# Patient Record
Sex: MALE | Race: WHITE | Employment: FULL TIME | URBAN - NONMETROPOLITAN AREA
[De-identification: names, ages, dates, MRNs, and addresses within clinical notes are randomized per-mention and may not be internally consistent; named-entity substitution may affect disease eponyms.]

---

## 2021-09-04 ENCOUNTER — HOSPITAL ENCOUNTER (EMERGENCY)
Age: 26
Discharge: HOME | End: 2021-09-05
Attending: INTERNAL MEDICINE
Payer: COMMERCIAL

## 2021-09-04 ENCOUNTER — APPOINTMENT (OUTPATIENT)
Dept: GENERAL RADIOLOGY | Age: 26
End: 2021-09-04
Payer: COMMERCIAL

## 2021-09-04 DIAGNOSIS — S41.111A LACERATIONS OF MULTIPLE SITES OF RIGHT ARM, INITIAL ENCOUNTER: Primary | ICD-10-CM

## 2021-09-04 DIAGNOSIS — F10.920 ACUTE ALCOHOLIC INTOXICATION WITHOUT COMPLICATION (HCC): ICD-10-CM

## 2021-09-04 DIAGNOSIS — Z23 NEED FOR TETANUS BOOSTER: ICD-10-CM

## 2021-09-04 DIAGNOSIS — S80.811A ABRASION OF RIGHT LOWER EXTREMITY, INITIAL ENCOUNTER: ICD-10-CM

## 2021-09-04 PROCEDURE — 90715 TDAP VACCINE 7 YRS/> IM: CPT | Performed by: INTERNAL MEDICINE

## 2021-09-04 PROCEDURE — 90471 IMMUNIZATION ADMIN: CPT | Performed by: INTERNAL MEDICINE

## 2021-09-04 PROCEDURE — 2500000003 HC RX 250 WO HCPCS: Performed by: INTERNAL MEDICINE

## 2021-09-04 PROCEDURE — 6360000002 HC RX W HCPCS: Performed by: INTERNAL MEDICINE

## 2021-09-04 PROCEDURE — 73090 X-RAY EXAM OF FOREARM: CPT

## 2021-09-04 PROCEDURE — 12005 RPR S/N/A/GEN/TRK12.6-20.0CM: CPT

## 2021-09-04 PROCEDURE — 99283 EMERGENCY DEPT VISIT LOW MDM: CPT

## 2021-09-04 RX ORDER — LIDOCAINE HYDROCHLORIDE 10 MG/ML
20 INJECTION, SOLUTION INFILTRATION; PERINEURAL ONCE
Status: COMPLETED | OUTPATIENT
Start: 2021-09-05 | End: 2021-09-04

## 2021-09-04 RX ADMIN — LIDOCAINE HYDROCHLORIDE 20 ML: 10 INJECTION, SOLUTION INFILTRATION; PERINEURAL at 23:52

## 2021-09-04 RX ADMIN — TETANUS TOXOID, REDUCED DIPHTHERIA TOXOID AND ACELLULAR PERTUSSIS VACCINE, ADSORBED 0.5 ML: 5; 2.5; 8; 8; 2.5 SUSPENSION INTRAMUSCULAR at 23:51

## 2021-09-04 ASSESSMENT — PAIN SCALES - GENERAL: PAINLEVEL_OUTOF10: 0

## 2021-09-05 VITALS
SYSTOLIC BLOOD PRESSURE: 119 MMHG | TEMPERATURE: 98.1 F | RESPIRATION RATE: 18 BRPM | BODY MASS INDEX: 31.9 KG/M2 | OXYGEN SATURATION: 98 % | DIASTOLIC BLOOD PRESSURE: 94 MMHG | WEIGHT: 235.2 LBS | HEART RATE: 74 BPM

## 2021-09-05 PROCEDURE — 6370000000 HC RX 637 (ALT 250 FOR IP)

## 2021-09-05 RX ORDER — GINSENG 100 MG
CAPSULE ORAL ONCE
Status: COMPLETED | OUTPATIENT
Start: 2021-09-05 | End: 2021-09-05

## 2021-09-05 RX ORDER — GINSENG 100 MG
CAPSULE ORAL
Status: COMPLETED
Start: 2021-09-05 | End: 2021-09-05

## 2021-09-05 RX ADMIN — Medication: at 01:07

## 2021-09-05 RX ADMIN — BACITRACIN: 500 OINTMENT TOPICAL at 01:07

## 2021-09-05 NOTE — ED NOTES
Bacitracin and gauze applied to right forearm. Patient given education on how to care for wound at home. Father in law also instructed.      Freda Figueredo RN  09/05/21 4691

## 2021-09-05 NOTE — ED PROVIDER NOTES
for abdominal distention, abdominal pain, diarrhea, nausea and vomiting. Genitourinary: Negative for difficulty urinating, dysuria, hematuria and urgency. Musculoskeletal: Negative for arthralgias, back pain and neck pain. Skin: Positive for multiple right arm lacerations, right knee abrasions, negative for color change, pallor, rash   Allergic/Immunologic: Negative for environmental allergies and food allergies. Neurological: Negative for head injury, loss of consciousness, dizziness, speech difficulty, weakness, distal tingling/numbness and headaches. Hematological: Negative for adenopathy. Does not bruise/bleed easily. Psychiatric/Behavioral: Positive for intoxication, negative for agitation, anxiety, depression, behavioral problems, confusion, hallucinations and suicidal ideas. Except as noted above the remainder of the review of systems was reviewed and negative. PASTMEDICAL HISTORY   History reviewed. No pertinent past medical history. SURGICAL HISTORY     History reviewed. No pertinent surgical history. CURRENT MEDICATIONS       There are no discharge medications for this patient. ALLERGIES     Patient has no known allergies. FAMILY HISTORY     History reviewed. No pertinent family history.        SOCIAL HISTORY       Social History     Socioeconomic History    Marital status: Single     Spouse name: None    Number of children: None    Years of education: None    Highest education level: None   Occupational History    None   Tobacco Use    Smoking status: Never Smoker    Smokeless tobacco: Never Used   Vaping Use    Vaping Use: Never used   Substance and Sexual Activity    Alcohol use: Yes     Comment: 3 times per month he drinks alcohol    Drug use: Never    Sexual activity: Yes     Partners: Female   Other Topics Concern    None   Social History Narrative    None     Social Determinants of Health     Financial Resource Strain:     Difficulty of Paying Living Expenses:    Food Insecurity:     Worried About [address] [facility] in the Last Year:     [address] in the Last Year:    Transportation Needs:     Lack of Transportation (Medical):  Lack of Transportation (Non-Medical):    Physical Activity:     Days of Exercise per Week:     Minutes of Exercise per Session:    Stress:     Feeling of Stress :    Social Connections:     Frequency of Communication with Friends and Family:     Frequency of Social Gatherings with Friends and Family:     Attends Orthodoxy Services:     Active Member of Clubs or Organizations:     Attends Club or Organization Meetings:     Marital Status:    Intimate Partner Violence:     Fear of Current or Ex-Partner:     Emotionally Abused:     Physically Abused:     Sexually Abused:        SCREENINGS    [place] Coma Scale  Eye Opening: Spontaneous  Best Verbal Response: Oriented  Best Motor Response: Obeys commands  Tomas Coma Scale Score: 15        PHYSICAL EXAM    (up to 7 for level 4, 8 or more for level 5)     ED Triage Vitals   BP Temp Temp src Pulse Resp SpO2 Height Weight   -- -- -- -- -- -- -- --       Physical Exam  Physical Exam   Constitutional:  Appears intoxicated but well-developed and well-nourished. No distress noted. Non toxic in appearance. Father in law at bedside present for the evaluation and procedure, supporting his son in law and monitoring his behavior. HENT:     Head: Normocephalic and atraumatic. No palpable tenderness. Right Ear: External ear normal. No otorrhea or bleeding observed. TM normal pearly light reflex without hemotympanum, mastoid tenderness, Melo sign. Left Ear: External ear normal.  No otorrhea or bleeding observed. TM normal pearly light reflex without hemotympanum, mastoid tenderness, Melo sign. Nose: Nose normal. No rhinorrhea or bleeding observed. Mouth/Throat: Oropharynx is clear and mucosa moist. No oropharyngeal exudate noted.  Posterior pharynx is pink and noninjected. Eyes: Conjunctivae and EOM are normal. Pupils are equal, round, and reactive to light. No scleral icterus. No tearing or drainage. Neck: Normal range of motion. Neck supple. No tracheal deviation present. No spinous tenderness or step-offs identified on palpation. There is no paraspinous tenderness. No neck pain elicited with axial loading. Cardiovascular: Normal rate, regular rhythm, normal heart sounds and intact distal pulses. Exam reveals no gallop or friction rub. No murmur heard. Pulmonary/Chest: Effort normal and breath sounds are symmetric and normal. No respiratory distress. There are no wheezes, rales or rhonchi. No tenderness is exhibited upon palpation of the chest wall. Abdominal: Soft. Bowel sounds are normal. No distension or no mass exhibitted. No organomegaly. There is no tenderness, rebound, rigidity or guarding. Genitourinary:   No CVA tenderness noted on examination. Musculoskeletal: Normal range of motion of all extremities and torso. Normal range of motion of the right elbow, wrist and fingers, passive, active and against resistance. No edema, tenderness or deformity. examination of the spine shows no spinous tenderness or step-offs identified on palpation. No paraspinous tenderness. Lymphadenopathy:  No cervical adenopathy. Neurological:   Alert and oriented to person, place, and time. Reflexes are normal.  There are no cranial nerve deficits. Normal muscle tone, motor and sensory function exhibitted. Strength 5/5 in all extremities and torso. Coordination and gait normal.   Skin: Skin is warm and dry. No rash noted. No diaphoresis. No erythema. No pallor. Multiple lacerations are noted on the right upper extremity including the forearm down to the wrist.  No active bleeding. No foreign bodies are identified. There is a 3 cm superficial flap over the distal ulna in a rectangular configuration with the open end on the distal aspect.    There are (2) linear 5 cm lacerations running side by side extending from this flap approximately, connected by a 1.5 cm irregular superficial laceration. There is a 1.5 cm gaping laceration of the proximal forearm without active bleeding or foreign bodies. Extensive superficial abrasions on the right knee overlying the patella, proximal and distal to it. Psychiatric: Intoxicated and overall pleasant. Uncooperative with use of local anesthetic. Judgment and thought content are questionable. DIAGNOSTIC RESULTS     EKG: All EKG's are interpreted by the Emergency Department Physician who either signs or Co-signs this chart in the absence of a cardiologist.    Not indicated. RADIOLOGY:   Non-plain film images such as CT, Ultrasoundand MRI are read by the radiologist. Plain radiographic images are visualized and preliminarily interpreted by the emergency physician with the below findings:    No radiopaque FB    Interpretation per the Radiologist below, if available at the time of this note:    XR RADIUS ULNA RIGHT (2 VIEWS)   Final Result      No acute osseous abnormality. ED BEDSIDE ULTRASOUND:   Performed by ED Physician - none    LABS:  Labs Reviewed - No data to display    All other labs were within normal range or not returned as of this dictation. EMERGENCY DEPARTMENT COURSE and DIFFERENTIAL DIAGNOSIS/MDM:   Vitals:    Vitals:    09/04/21 2346 09/04/21 2355   BP: (!) 119/94    Pulse: 74    Resp: 18    Temp: 98.1 °F (36.7 °C)    TempSrc: Oral    SpO2: 98% 98%   Weight: 235 lb 3.2 oz (106.7 kg)        Noted    MDM    CRITICAL CARE TIME   Total Critical Care time was 0 minutes.       EDCOURSE       CONSULTS:  None    PROCEDURES:  Unless otherwise noted below, none     Lac Repair    Date/Time: 9/6/2021 6:33 PM  Performed by: Meli Calderon MD  Authorized by: Meli Calderon MD     Consent:     Consent obtained:  Verbal    Consent given by:  Patient    Risks discussed:  Infection, pain, poor (see MAR for exact dosages): Anesthesia method:  None  Laceration details:     Location:  Shoulder/arm    Shoulder/arm location:  R lower arm    Length (cm):  0.8    Depth (mm):  2  Repair type:     Repair type:  Simple  Pre-procedure details:     Preparation:  Patient was prepped and draped in usual sterile fashion  Exploration:     Hemostasis achieved with:  Direct pressure    Wound exploration: wound explored through full range of motion and entire depth of wound probed and visualized      Wound extent: no foreign bodies/material noted, no muscle damage noted, no nerve damage noted, no tendon damage noted, no underlying fracture noted and no vascular damage noted      Contaminated: no    Treatment:     Area cleansed with:  Betadine    Amount of cleaning:  Standard    Irrigation solution:  Sterile saline    Irrigation volume:  100    Irrigation method:  Pressure wash    Visualized foreign bodies/material removed: no    Skin repair:     Repair method:  Tissue adhesive and Steri-Strips    Number of Steri-Strips:  4  Approximation:     Approximation:  Close  Post-procedure details:     Patient tolerance of procedure: Tolerated well, no immediate complications  Comments: This is a linear nongaping laceration located distal to the tip of the distal ulna. Procedure performed without local anesthesia due to patient refusal.   Lac Repair    Date/Time: 9/6/2021 7:00 PM  Performed by: Sury Boyce MD  Authorized by: Sury Boyce MD     Consent:     Consent obtained:  Verbal    Consent given by:  Patient    Risks discussed:  Infection, pain, poor cosmetic result, poor wound healing, retained foreign body, need for additional repair and nerve damage  Anesthesia (see MAR for exact dosages):      Anesthesia method:  None  Laceration details:     Location:  Shoulder/arm    Shoulder/arm location:  R lower arm    Length (cm):  10    Depth (mm):  2  Repair type:     Repair type:  Simple  Pre-procedure details: Preparation:  Patient was prepped and draped in usual sterile fashion  Exploration:     Hemostasis achieved with:  Direct pressure    Wound exploration: wound explored through full range of motion      Wound extent: no foreign bodies/material noted, no muscle damage noted, no nerve damage noted, no tendon damage noted, no underlying fracture noted and no vascular damage noted      Contaminated: no    Treatment:     Area cleansed with:  Betadine    Amount of cleaning:  Standard    Irrigation solution:  Sterile saline    Irrigation volume:  200    Irrigation method:  Pressure wash    Visualized foreign bodies/material removed: no    Skin repair:     Repair method:  Steri-Strips and tissue adhesive    Number of Steri-Strips:  5  Approximation:     Approximation:  Close  Post-procedure details:     Patient tolerance of procedure: Tolerated well, no immediate complications  Comments:      (2) 5cm linear lacerations side by side on lateral aspect of distal forearm proximal to flap. Procedure performed without local anesthesia due to patient refusal.   Lac Repair    Date/Time: 9/6/2021 7:03 PM  Performed by: Gaurang Leger MD  Authorized by: Gaurang Leger MD     Consent:     Consent obtained:  Verbal    Consent given by:  Patient    Risks discussed:  Infection, pain, poor wound healing, poor cosmetic result, need for additional repair, retained foreign body, vascular damage and nerve damage  Anesthesia (see MAR for exact dosages):      Anesthesia method:  None  Laceration details:     Location:  Shoulder/arm    Shoulder/arm location:  R lower arm    Length (cm):  0.8    Depth (mm):  2  Repair type:     Repair type:  Simple  Pre-procedure details:     Preparation:  Patient was prepped and draped in usual sterile fashion  Exploration:     Hemostasis achieved with:  Direct pressure    Wound exploration: wound explored through full range of motion and entire depth of wound probed and visualized      Wound extent: no foreign bodies/material noted, no muscle damage noted, no nerve damage noted, no tendon damage noted, no underlying fracture noted and no vascular damage noted      Contaminated: no    Treatment:     Area cleansed with:  Betadine    Amount of cleaning:  Standard    Irrigation solution:  Sterile saline    Irrigation volume:  100    Irrigation method:  Pressure wash    Visualized foreign bodies/material removed: no    Skin repair:     Repair method:  Sutures    Suture size:  4-0    Suture material:  Nylon    Suture technique:  Simple interrupted    Number of sutures:  3  Approximation:     Approximation:  Close  Post-procedure details:     Dressing:  Antibiotic ointment    Patient tolerance of procedure: Tolerated well, no immediate complications  Comments: This is a 0.8 cm gaping laceration proximal forearm. Procedure performed without local anesthesia due to patient refusal.   Lac Repair    Date/Time: 9/6/2021 7:11 PM  Performed by: Juanita Mejias MD  Authorized by: Juanita Mejias MD     Consent:     Consent obtained:  Verbal    Consent given by:  Patient    Risks discussed:  Infection, pain, poor cosmetic result, poor wound healing, retained foreign body, vascular damage, need for additional repair and nerve damage  Anesthesia (see MAR for exact dosages):      Anesthesia method:  None  Laceration details:     Location:  Shoulder/arm    Shoulder/arm location:  R lower arm    Length (cm):  1.5    Depth (mm):  1  Repair type:     Repair type:  Simple  Pre-procedure details:     Preparation:  Patient was prepped and draped in usual sterile fashion  Exploration:     Hemostasis achieved with:  Direct pressure    Wound exploration: wound explored through full range of motion and entire depth of wound probed and visualized      Wound extent: no foreign bodies/material noted, no muscle damage noted, no nerve damage noted, no tendon damage noted, no underlying fracture noted and no vascular damage noted any concern for altered mental status, difficulty breathing, dehydration or loss of function. ED Medicationsadministered this visit:    Medications   lidocaine 1 % injection 20 mL (20 mLs IntraDERmal Given 9/4/21 2352)   Tetanus-Diphth-Acell Pertussis (BOOSTRIX) injection 0.5 mL (0.5 mLs IntraMUSCular Given 9/4/21 2351)   bacitracin ointment ( Topical Given 9/5/21 0107)       New Prescriptions from this visit:    There are no discharge medications for this patient. Follow-up:  [facility] ED  [address] [address]  [phone]  Go to   As needed, If symptoms worsen    [facility] [phone]    Schedule an appointment as soon as possible for a visit in 10 days  For suture removal in 10-14 days        Final Impression:   1. Lacerations of multiple sites of right arm, initial encounter    2. Need for tetanus booster    3. Acute alcoholic intoxication without complication ([address]    4. Abrasion of right lower extremity, initial encounter               (Please note that portions of this note werecompleted with a voice recognition program.  Efforts were made to edit the dictations but occasionally words are mis-transcribed.)    FINAL IMPRESSION      1. Lacerations of multiple sites of right arm, initial encounter    2. Need for tetanus booster    3. Acute alcoholic intoxication without complication ([address]    4.  Abrasion of right lower extremity, initial encounter          DISPOSITION/PLAN   DISPOSITION Decision To Discharge 09/05/2021 12:58:09 AM      PATIENT REFERRED TO:  [facility] ED  [address] [address]  [phone]  Go to   As needed, If symptoms worsen    MD [facility] [phone]    Schedule an appointment as soon as possible for a visit in 10 days  For suture removal in 10-14 days      DISCHARGE MEDICATIONS:  There are no discharge medications for this patient.          (Please note that portions of this note were completed with a voice recognition program.  Efforts were made to edit the dictations but occasionally words are mis-transcribed.)    Wilfredo Moody MD (electronically signed)  Attending Emergency Physician          Wilfredo Moody MD  09/06/21 [place] MD Gloria  09/06/21 [id number]